# Patient Record
Sex: FEMALE
[De-identification: names, ages, dates, MRNs, and addresses within clinical notes are randomized per-mention and may not be internally consistent; named-entity substitution may affect disease eponyms.]

---

## 2019-03-20 PROBLEM — Z00.00 ENCOUNTER FOR PREVENTIVE HEALTH EXAMINATION: Status: ACTIVE | Noted: 2019-03-20

## 2021-04-07 ENCOUNTER — APPOINTMENT (OUTPATIENT)
Dept: PEDIATRIC MEDICAL GENETICS | Facility: CLINIC | Age: 26
End: 2021-04-07
Payer: COMMERCIAL

## 2021-04-07 VITALS
SYSTOLIC BLOOD PRESSURE: 120 MMHG | HEART RATE: 66 BPM | DIASTOLIC BLOOD PRESSURE: 84 MMHG | TEMPERATURE: 97.7 F | BODY MASS INDEX: 20.87 KG/M2 | WEIGHT: 114.86 LBS | HEIGHT: 62.2 IN

## 2021-04-07 DIAGNOSIS — M24.9 JOINT DERANGEMENT, UNSPECIFIED: ICD-10-CM

## 2021-04-07 DIAGNOSIS — G89.29 PAIN, UNSPECIFIED: ICD-10-CM

## 2021-04-07 DIAGNOSIS — Z86.19 PERSONAL HISTORY OF OTHER INFECTIOUS AND PARASITIC DISEASES: ICD-10-CM

## 2021-04-07 DIAGNOSIS — K59.00 CONSTIPATION, UNSPECIFIED: ICD-10-CM

## 2021-04-07 DIAGNOSIS — E71.40 DISORDER OF CARNITINE METABOLISM, UNSPECIFIED: ICD-10-CM

## 2021-04-07 DIAGNOSIS — R52 PAIN, UNSPECIFIED: ICD-10-CM

## 2021-04-07 DIAGNOSIS — I49.8 OTHER SPECIFIED CARDIAC ARRHYTHMIAS: ICD-10-CM

## 2021-04-07 DIAGNOSIS — M25.50 PAIN IN UNSPECIFIED JOINT: ICD-10-CM

## 2021-04-07 DIAGNOSIS — M26.09 OTHER SPECIFIED ANOMALIES OF JAW SIZE: ICD-10-CM

## 2021-04-07 DIAGNOSIS — R53.1 WEAKNESS: ICD-10-CM

## 2021-04-07 DIAGNOSIS — L57.4 CUTIS LAXA SENILIS: ICD-10-CM

## 2021-04-07 DIAGNOSIS — R53.82 CHRONIC FATIGUE, UNSPECIFIED: ICD-10-CM

## 2021-04-07 PROCEDURE — 99205 OFFICE O/P NEW HI 60 MIN: CPT

## 2021-04-07 PROCEDURE — 99072 ADDL SUPL MATRL&STAF TM PHE: CPT

## 2021-04-07 NOTE — PHYSICAL EXAM
[Fine Motor Coordination] : fine motor coordination is normal [] : Yes [Total Score ___] : Total Score = [unfilled] [Normal] : orientated to person, place, and time [de-identified] : very pleasant, somewhat anxious  female, looks somewhat older than stated age [de-identified] : +soft/velvety texture with mild to moderate skin extansibility, +peripubertal striae on flanks/hips without weight fluctuations, one slightly atrophic chicken pox scar on cheek, no other atrophic/spontaneous/hemosideritic scars, +mildly increaseed vascular markings on upper chest and inner arms, +piezogenic papules on both heels, no hypo/hyperpigmented lesions, no varicosities noted,  [de-identified] : HC 51.75 cm (<2%), normal HL, +mutliple grey hairs noted diffusely, normal malar region [de-identified] : no cc/si, normal sclera, slgithly prominent [de-identified] : no p/t/c [de-identified] : prominent and narrow nasal bridge, low nasal columella [de-identified] : short philtrum, thin upper lip, +micrognathia with midline dimple, malaligned overbite, normal palate and uvula [de-identified] : no pectus [de-identified] : +DIP/PIP finger hyperextensibilty, normal fingernails and palmar creases, negative thumb sign, positive wrist sign on left only, wide and brachydactylous feet with medial great toe deviation, dyskeratotic thickened toenails, +pes planus [de-identified] : minimal levokyphoscoliosis noted [de-identified] : Normal reflexes, no hand myotonia or  release issues, no fasciculations noted, no easy fatigueability on finger to thumb, normal finger-to-nose, normal gait, no cerebellar signs  [FreeTextEntry2] : 158 [FreeTextEntry3] : 161 [FreeTextEntry4] : 1.019 [Right] : Right: N [Left] : Left: N

## 2021-04-08 LAB
DEPRECATED KAPPA LC FREE/LAMBDA SER: 1.13 RATIO
IGA SER QL IEP: 220 MG/DL
IGG SER QL IEP: 1309 MG/DL
IGM SER QL IEP: 114 MG/DL
IRON SATN MFR SERPL: 41 %
IRON SERPL-MCNC: 147 UG/DL
KAPPA LC CSF-MCNC: 1.22 MG/DL
KAPPA LC SERPL-MCNC: 1.38 MG/DL
LACTATE BLDA-MCNC: 0.7 MMOL/L
TIBC SERPL-MCNC: 361 UG/DL
UIBC SERPL-MCNC: 214 UG/DL

## 2021-04-09 LAB — TRYPTASE: 4.2 UG/L

## 2021-04-10 LAB — COPPER SERPL-MCNC: 145 UG/DL

## 2021-04-11 ENCOUNTER — TRANSCRIPTION ENCOUNTER (OUTPATIENT)
Age: 26
End: 2021-04-11

## 2021-04-12 LAB
CARNITINE FREE SERPL-SCNC: 18 UMOL/L
CARNITINE SERPL-SCNC: 27 UMOL/L
ESTERIFIED/FREE: 0.5 RATIO
PYRUVATE SERPL-MCNC: 2.74 MG/DL

## 2021-04-13 LAB
ACYL C3: 0.21 UMOL/L
C10: 0.95 UMOL/L
C10:1: 0.33 UMOL/L
C10:2: 0.02 UMOL/L
C12: 0.15 UMOL/L
C14-OH: 0.01 UMOL/L
C14: 0.06 UMOL/L
C14:1: 0.12 UMOL/L
C14:2: 0.05 UMOL/L
C16-OH: 0.01 UMOL/L
C16: 0.16 UMOL/L
C16:1-OH: 0.01 UMOL/L
C16:1: 0.04 UMOL/L
C18-OH: 0 UMOL/L
C18: 0.05 UMOL/L
C18:1-OH: 0 UMOL/L
C18:1: 0.23 UMOL/L
C18:2-OH: 0 UMOL/L
C18:2: 0.12 UMOL/L
C2: 7.41 UMOL/L
C3-DC: 0.06 UMOL/L
C4-DC: 0.05 UMOL/L
C4-OH: 0.04 UMOL/L
C4: 0.24 UMOL/L
C5-OH: 0.03 UMOL/L
C5: 0.07 UMOL/L
C5:1: 0.01 UMOL/L
C6: 0.12 UMOL/L
C8: 0.73 UMOL/L
DIRECTOR REVIEW: NORMAL
GLUTARYLCARN SERPL-SCNC: 0.08 UMOL/L
INTERPRETATION: NORMAL
Lab: NORMAL
Lab: NORMAL
UBIQUINONE10 SERPL-MCNC: 1.07 UG/ML

## 2021-04-16 LAB
A-TOCOPHEROL VIT E SERPL-MCNC: 13.3 MG/L
BETA+GAMMA TOCOPHEROL SERPL-MCNC: 0.2 MG/L

## 2021-04-21 PROBLEM — E71.40: Status: ACTIVE | Noted: 2021-04-21

## 2021-04-22 ENCOUNTER — NON-APPOINTMENT (OUTPATIENT)
Age: 26
End: 2021-04-22

## 2021-04-27 LAB
CARNITINE FREE SERPL-SCNC: 16 UMOL/L
CARNITINE SERPL-SCNC: 29 UMOL/L
ESTERIFIED/FREE: 0.8 RATIO

## 2021-04-30 LAB
ACYL C3: 0.25 UMOL/L
C10: 1.15 UMOL/L
C10:1: 0.45 UMOL/L
C10:2: 0.03 UMOL/L
C12: 0.13 UMOL/L
C14-OH: 0.01 UMOL/L
C14: 0.04 UMOL/L
C14:1: 0.09 UMOL/L
C14:2: 0.05 UMOL/L
C16-OH: 0 UMOL/L
C16: 0.12 UMOL/L
C16:1-OH: 0.01 UMOL/L
C16:1: 0.03 UMOL/L
C18-OH: 0 UMOL/L
C18: 0.04 UMOL/L
C18:1-OH: 0 UMOL/L
C18:1: 0.13 UMOL/L
C18:2-OH: 0 UMOL/L
C18:2: 0.07 UMOL/L
C2: 5.55 UMOL/L
C3-DC: 0.08 UMOL/L
C4-DC: 0.06 UMOL/L
C4-OH: 0.03 UMOL/L
C4: 0.26 UMOL/L
C5-OH: 0.03 UMOL/L
C5: 0.1 UMOL/L
C5:1: 0.01 UMOL/L
C6: 0.16 UMOL/L
C8: 0.82 UMOL/L
DIRECTOR REVIEW: NORMAL
GLUTARYLCARN SERPL-SCNC: 0.08 UMOL/L
INTERPRETATION: NORMAL
Lab: NORMAL
Lab: NORMAL

## 2021-05-17 LAB
MISCELLANEOUS TEST: NORMAL
PROC NAME: NORMAL

## 2021-06-09 ENCOUNTER — APPOINTMENT (OUTPATIENT)
Dept: PEDIATRIC MEDICAL GENETICS | Facility: CLINIC | Age: 26
End: 2021-06-09

## 2021-06-16 ENCOUNTER — APPOINTMENT (OUTPATIENT)
Dept: PEDIATRIC MEDICAL GENETICS | Facility: CLINIC | Age: 26
End: 2021-06-16
Payer: COMMERCIAL

## 2021-06-16 PROCEDURE — ZZZZZ: CPT

## 2021-06-16 NOTE — REASON FOR VISIT
[Home] : at home, [unfilled] , at the time of the visit. [Medical Office: ([facility])___] : at the medical office located in  [Other:____] : [unfilled] [Verbal consent obtained from patient] : the patient, [unfilled]

## 2021-07-01 LAB — VIT C SERPL-MCNC: 1.2 MG/DL

## 2021-08-04 ENCOUNTER — APPOINTMENT (OUTPATIENT)
Dept: PEDIATRIC MEDICAL GENETICS | Facility: CLINIC | Age: 26
End: 2021-08-04
Payer: COMMERCIAL

## 2021-08-04 PROCEDURE — 99214 OFFICE O/P EST MOD 30 MIN: CPT | Mod: 95

## 2022-10-27 ENCOUNTER — NON-APPOINTMENT (OUTPATIENT)
Age: 27
End: 2022-10-27

## 2022-11-09 ENCOUNTER — NON-APPOINTMENT (OUTPATIENT)
Age: 27
End: 2022-11-09

## 2022-11-16 ENCOUNTER — NON-APPOINTMENT (OUTPATIENT)
Age: 27
End: 2022-11-16

## 2023-02-09 ENCOUNTER — NON-APPOINTMENT (OUTPATIENT)
Age: 28
End: 2023-02-09

## 2023-06-01 ENCOUNTER — NON-APPOINTMENT (OUTPATIENT)
Age: 28
End: 2023-06-01

## 2025-05-10 ENCOUNTER — NON-APPOINTMENT (OUTPATIENT)
Age: 30
End: 2025-05-10

## 2025-05-28 ENCOUNTER — NON-APPOINTMENT (OUTPATIENT)
Age: 30
End: 2025-05-28

## 2025-05-30 ENCOUNTER — APPOINTMENT (OUTPATIENT)
Dept: CARDIOLOGY | Facility: CLINIC | Age: 30
End: 2025-05-30
Payer: COMMERCIAL

## 2025-05-30 VITALS
WEIGHT: 141 LBS | HEART RATE: 72 BPM | OXYGEN SATURATION: 94 % | HEIGHT: 62 IN | SYSTOLIC BLOOD PRESSURE: 118 MMHG | BODY MASS INDEX: 25.95 KG/M2 | DIASTOLIC BLOOD PRESSURE: 78 MMHG

## 2025-05-30 DIAGNOSIS — G90.A POSTURAL ORTHOSTATIC TACHYCARDIA SYNDROME [POTS]: ICD-10-CM

## 2025-05-30 DIAGNOSIS — I10 ESSENTIAL (PRIMARY) HYPERTENSION: ICD-10-CM

## 2025-05-30 PROCEDURE — 93000 ELECTROCARDIOGRAM COMPLETE: CPT

## 2025-05-30 PROCEDURE — G2211 COMPLEX E/M VISIT ADD ON: CPT | Mod: NC

## 2025-05-30 PROCEDURE — 99204 OFFICE O/P NEW MOD 45 MIN: CPT

## 2025-05-30 RX ORDER — METOPROLOL SUCCINATE 25 MG/1
25 TABLET, EXTENDED RELEASE ORAL
Refills: 0 | Status: ACTIVE | COMMUNITY
Start: 2025-05-30